# Patient Record
Sex: MALE | Race: WHITE | NOT HISPANIC OR LATINO | Employment: OTHER | URBAN - METROPOLITAN AREA
[De-identification: names, ages, dates, MRNs, and addresses within clinical notes are randomized per-mention and may not be internally consistent; named-entity substitution may affect disease eponyms.]

---

## 2017-02-13 ENCOUNTER — ALLSCRIPTS OFFICE VISIT (OUTPATIENT)
Dept: OTHER | Facility: OTHER | Age: 77
End: 2017-02-13

## 2017-02-16 ENCOUNTER — ALLSCRIPTS OFFICE VISIT (OUTPATIENT)
Dept: RADIOLOGY | Facility: CLINIC | Age: 77
End: 2017-02-16
Payer: COMMERCIAL

## 2017-03-02 ENCOUNTER — ALLSCRIPTS OFFICE VISIT (OUTPATIENT)
Dept: RADIOLOGY | Facility: CLINIC | Age: 77
End: 2017-03-02
Payer: COMMERCIAL

## 2017-03-09 ENCOUNTER — GENERIC CONVERSION - ENCOUNTER (OUTPATIENT)
Dept: OTHER | Facility: OTHER | Age: 77
End: 2017-03-09

## 2017-03-16 ENCOUNTER — ALLSCRIPTS OFFICE VISIT (OUTPATIENT)
Dept: RADIOLOGY | Facility: CLINIC | Age: 77
End: 2017-03-16
Payer: COMMERCIAL

## 2017-03-16 ENCOUNTER — GENERIC CONVERSION - ENCOUNTER (OUTPATIENT)
Dept: OTHER | Facility: OTHER | Age: 77
End: 2017-03-16

## 2017-03-23 ENCOUNTER — GENERIC CONVERSION - ENCOUNTER (OUTPATIENT)
Dept: OTHER | Facility: OTHER | Age: 77
End: 2017-03-23

## 2017-03-31 ENCOUNTER — ALLSCRIPTS OFFICE VISIT (OUTPATIENT)
Dept: OTHER | Facility: OTHER | Age: 77
End: 2017-03-31

## 2018-01-09 NOTE — RESULT NOTES
Message   Recorded as Task   Date: 03/22/2017 09:34 AM, Created By: Shadia Marsh   Task Name: Follow Up   Assigned To: [facility],Team   Regarding Patient: Kezia Rodriguez, Status: Active   Comment:    Charity Callejas - 22 Mar 2017 9:34 AM     TASK CREATED  S/P LT L4-5 TFESI #3 WITH SL ON 3/16/17  HAS 2 WEEK F/U WITH SL ON 3/97/64   Madelyn Burton - 23 Mar 6178 39:57 PM     TASK EDITED  Pt reports 80% relief and doing well       Confirmed f/u 3/31   Kentrell Loaiza - 23 Mar 2017 12:22 PM     TASK REPLIED TO: Previously Assigned To Kentrell Loaiza                      [place]        Signatures   Electronically signed by : Sadiq Nation, ; Mar 23 2017  1:08PM EST                       (Author)

## 2018-01-13 VITALS
DIASTOLIC BLOOD PRESSURE: 80 MMHG | BODY MASS INDEX: 22.5 KG/M2 | WEIGHT: 140 LBS | HEIGHT: 66 IN | SYSTOLIC BLOOD PRESSURE: 148 MMHG | HEART RATE: 84 BPM | TEMPERATURE: 98.3 F

## 2018-01-13 VITALS
DIASTOLIC BLOOD PRESSURE: 78 MMHG | HEART RATE: 80 BPM | BODY MASS INDEX: 24.11 KG/M2 | TEMPERATURE: 98.2 F | WEIGHT: 150 LBS | SYSTOLIC BLOOD PRESSURE: 130 MMHG | HEIGHT: 66 IN

## 2018-01-13 NOTE — RESULT NOTES
Message   Recorded as Task   Date: 03/08/2017 11:34 AM, Created By: Pat Sepulveda   Task Name: Follow Up   Assigned To: [facility]   Regarding Patient: Jennyfer Agent, Status: Active   Comment:    Maribeth Oleary - 08 Mar 2017 11:34 AM     TASK CREATED  S/pLeft L4-5 TFESI #2 on 3/9/17 w/SL in [place]  LT L4-5 TFESI #3 scheduled for 3/16/17    Please call 3/9/17   Charity Callejas - 08 Mar 2017 11:53 AM     TASK EDITED  MSG C# FOR PT TO CB   Maribeth Oleary - 08 Mar 2017 2:49 PM     TASK EDITED  S/w pt for f/u after injection  -states his pain seems to be worse than before the injection  -he had good pain relief for 3 days after injection but pain has returned  -isn't sure he wants to proceed with third injection but will give it more time to see if her gets more pain relief  -pt states he is also going to start PT & see if that helps   Kentrell Loaiza - 08 Mar 2017 4:38 PM     TASK REPLIED TO: Previously Assigned To Kentrell Loaiza  [place]   [facility] - 09 Mar 9879 5:84 AM     TASK COMPLETED   Leti Fonseca - 10 Mar 2017 10:33 AM     TASK REACTIVATED   Leti Fonseca - 10 Mar 2017 10:35 AM     TASK EDITED  Please call the patient [phone]  patient called back today requesting a consult w/ Dr Harini Fulton   before his next injection  Not sure he wants to go through w/ 3rd inj  [facility] - 16 Mar 2020 41:97 AM     TASK EDITED  Patient came in for 3rd injection today, will f/u after that injection          Signatures   Electronically signed by : Marky Tamayo, ; Mar 16 2017 10:47AM EST                       (Author)

## 2018-01-16 NOTE — RESULT NOTES
Message   Recorded as Task   Date: 03/08/2017 11:34 AM, Created By: Wei Garcia   Task Name: Follow Up   Assigned To: [facility]   Regarding Patient: Christian Yan, Status: Active   Comment:    Maribeth Oleary - 08 Mar 2017 11:34 AM     TASK CREATED  S/pLeft L4-5 TFESI #2 on 3/9/17 w/SL in [facility]  LT L4-5 TFESI #3 scheduled for 3/16/17    Please call 3/9/17   Charity Callejas - 08 Mar 2017 11:53 AM     TASK EDITED  MSG C# FOR PT TO CB   Maribeth Oleary - 08 Mar 2017 2:49 PM     TASK EDITED  S/w pt for f/u after injection  -states his pain seems to be worse than before the injection  -he had good pain relief for 3 days after injection but pain has returned  -isn't sure he wants to proceed with third injection but will give it more time to see if her gets more pain relief  -pt states he is also going to start PT & see if that helps   Kentrell Loaiza - 08 Mar 2017 4:38 PM     TASK REPLIED TO: Previously Assigned To Kentrell Loaiza  [place]        Signatures   Electronically signed by : Chad Colvin, ; Mar  9 2017  7:57AM EST                       (Author)

## 2022-08-12 ENCOUNTER — OFFICE VISIT (OUTPATIENT)
Dept: URGENT CARE | Age: 82
End: 2022-08-12
Payer: COMMERCIAL

## 2022-08-12 VITALS
SYSTOLIC BLOOD PRESSURE: 132 MMHG | HEART RATE: 72 BPM | OXYGEN SATURATION: 97 % | DIASTOLIC BLOOD PRESSURE: 70 MMHG | TEMPERATURE: 98.4 F | RESPIRATION RATE: 18 BRPM

## 2022-08-12 DIAGNOSIS — U07.1 COVID: Primary | ICD-10-CM

## 2022-08-12 LAB
SARS-COV-2 AG UPPER RESP QL IA: POSITIVE
VALID CONTROL: ABNORMAL

## 2022-08-12 PROCEDURE — 87811 SARS-COV-2 COVID19 W/OPTIC: CPT

## 2022-08-12 PROCEDURE — 99213 OFFICE O/P EST LOW 20 MIN: CPT

## 2022-08-12 RX ORDER — AMLODIPINE BESYLATE AND BENAZEPRIL HYDROCHLORIDE 5; 20 MG/1; MG/1
1 CAPSULE ORAL DAILY
COMMUNITY
Start: 2022-06-10

## 2022-08-12 RX ORDER — MELOXICAM 15 MG/1
15 TABLET ORAL DAILY
COMMUNITY
Start: 2022-07-18

## 2022-08-12 RX ORDER — BENZONATATE 200 MG/1
200 CAPSULE ORAL 3 TIMES DAILY PRN
Qty: 20 CAPSULE | Refills: 0 | Status: SHIPPED | OUTPATIENT
Start: 2022-08-12

## 2022-08-12 RX ORDER — CHLORTHALIDONE 25 MG/1
25 TABLET ORAL DAILY
COMMUNITY
Start: 2022-05-09

## 2022-08-12 RX ORDER — METFORMIN HYDROCHLORIDE 500 MG/1
500 TABLET, EXTENDED RELEASE ORAL
COMMUNITY
Start: 2022-05-20

## 2022-08-12 NOTE — PROGRESS NOTES
[facility]        NAME: Javier Hawkins is a 81 y o  male  : 1940    MRN: [id number]  DATE: 2022  TIME: 10:21 AM    Assessment and Plan   COVID [U07 1]  1  COVID  benzonatate (TESSALON) 200 MG capsule    Poct Covid 19 Rapid Antigen Test     Patient presents with complaints of cough and congestion  Several family members have tested positive  Has been trying OTC medication  Assessment notes congestion, cough, clear breath sounds  POCT COVID positive  Discussed Paxlovid  At this time patient declined  Patient Instructions       Follow up with PCP as needed    Chief Complaint     Chief Complaint   Patient presents with    Cold Like Symptoms     Patient relates cough starting yesterday  Daughter tested positive Saturday, so did wife  History of Present Illness       Patient presents with complaints of cough and congestion  Several family members have tested positive  Has been trying OTC medication  Assessment notes congestion, cough, clear breath sounds  POCT COVID positive  Discussed Paxlovid  At this time patient declined  Review of Systems   Review of Systems   Constitutional: Negative for chills and fever  HENT: Positive for congestion and postnasal drip  Negative for ear pain, sinus pain and sore throat  Eyes: Negative for pain and itching  Respiratory: Positive for cough  Negative for shortness of breath and wheezing  Cardiovascular: Negative for chest pain and palpitations  Gastrointestinal: Negative for abdominal pain, constipation, diarrhea, nausea and vomiting  Genitourinary: Negative for difficulty urinating and hematuria  Musculoskeletal: Negative for arthralgias and myalgias  Skin: Negative for rash  Neurological: Negative for dizziness, light-headedness and headaches  Psychiatric/Behavioral: Negative for agitation and sleep disturbance  The patient is not nervous/anxious            Current Medications       Current Outpatient Medications:     amLODIPine-benazepril (LOTREL 5-20) 5-20 MG per capsule, Take 1 capsule by mouth daily, Disp: , Rfl:     amLODIPine-benazepril (LOTREL 5-20) 5-20 MG per capsule, Take 1 capsule by mouth daily, Disp: , Rfl:     benzonatate (TESSALON) 200 MG capsule, Take 1 capsule (200 mg total) by mouth 3 (three) times a day as needed for cough, Disp: 20 capsule, Rfl: 0    chlorthalidone 25 mg tablet, Take 25 mg by mouth daily, Disp: , Rfl:     meloxicam (MOBIC) 15 mg tablet, Take 15 mg by mouth daily, Disp: , Rfl:     metFORMIN (GLUCOPHAGE-XR) 500 mg 24 hr tablet, Take 500 mg by mouth daily with breakfast, Disp: , Rfl:     Current Allergies     Allergies as of 08/12/2022    (No Known Allergies)            The following portions of the patient's history were reviewed and updated as appropriate: allergies, current medications, past family history, past medical history, past social history, past surgical history and problem list      No past medical history on file  No past surgical history on file  No family history on file  Medications have been verified  Objective   /70   Pulse 72   Temp 98 4 °F (36 9 °C)   Resp 18   SpO2 97%   No LMP for male patient  Physical Exam     Physical Exam  Vitals reviewed  Constitutional:       General: He is not in acute distress  Appearance: Normal appearance  HENT:      Head: Normocephalic and atraumatic  Right Ear: Tympanic membrane and ear canal normal       Left Ear: Tympanic membrane and ear canal normal       Nose: Congestion present  Mouth/Throat:      Mouth: Mucous membranes are moist       Pharynx: No oropharyngeal exudate or posterior oropharyngeal erythema  Eyes:      Extraocular Movements: Extraocular movements intact  Conjunctiva/sclera: Conjunctivae normal       Pupils: Pupils are equal, round, and reactive to light  Cardiovascular:      Rate and Rhythm: Normal rate and regular rhythm        Pulses: Normal pulses  Heart sounds: Normal heart sounds  No murmur heard  Pulmonary:      Effort: Pulmonary effort is normal  No respiratory distress  Breath sounds: Normal breath sounds  Abdominal:      General: Abdomen is flat  Bowel sounds are normal  There is no distension  Palpations: Abdomen is soft  Tenderness: There is no abdominal tenderness  There is no guarding or rebound  Musculoskeletal:         General: No swelling or tenderness  Normal range of motion  Cervical back: Normal range of motion and neck supple  No tenderness  Lymphadenopathy:      Cervical: No cervical adenopathy  Skin:     General: Skin is warm  Neurological:      General: No focal deficit present  Mental Status: He is alert     Psychiatric:         Mood and Affect: Mood normal          Behavior: Behavior normal          Judgment: Judgment normal

## 2025-01-03 PROCEDURE — 88305 TISSUE EXAM BY PATHOLOGIST: CPT | Performed by: PATHOLOGY

## 2025-01-08 PROCEDURE — 88305 TISSUE EXAM BY PATHOLOGIST: CPT | Performed by: PATHOLOGY
